# Patient Record
Sex: MALE | URBAN - METROPOLITAN AREA
[De-identification: names, ages, dates, MRNs, and addresses within clinical notes are randomized per-mention and may not be internally consistent; named-entity substitution may affect disease eponyms.]

---

## 2017-05-23 ENCOUNTER — THERAPY VISIT (OUTPATIENT)
Dept: PHYSICAL THERAPY | Facility: CLINIC | Age: 57
End: 2017-05-23
Payer: COMMERCIAL

## 2017-05-23 DIAGNOSIS — M54.42 BILATERAL LOW BACK PAIN WITH LEFT-SIDED SCIATICA: Primary | ICD-10-CM

## 2017-05-23 PROCEDURE — 97161 PT EVAL LOW COMPLEX 20 MIN: CPT | Mod: GP | Performed by: PHYSICAL THERAPIST

## 2017-05-23 PROCEDURE — 97110 THERAPEUTIC EXERCISES: CPT | Mod: GP | Performed by: PHYSICAL THERAPIST

## 2017-05-23 NOTE — LETTER
[facility] ATHLETIC [facility] PHYSICAL [facility]  [address]  [facility] [address]  [phone]    May 24, 2017    Re: Mati Hernandez   :   1960   MRN:  [id number]   REFERRING PHYSICIAN:   Jonah Allison    [facility] ATHLETIC [facility] PHYSICAL [facility]  Date of Initial Evaluation:  2017  Visits:1   Rxs Used: 1  Reason for Referral:  Bilateral low back pain with left-sided sciatica  EVALUATION SUMMARY  Subjective:  Patient is a 56 year old male presenting with rehab back hpi. The history is provided by the patient. Mati Hernandez is a 56 year old male with a lumbar condition.  Condition occurred with:  Insidious onset.  Condition occurred: for unknown reasons.  This is a recurrent and chronic condition  Patient reports a 2-3 year history of low back pain which he relates to prolonged standing for work as .  Patient felt his left low back pain was improving/ was controlled with meds until about 4 weeks ago.  At that time patient experienced a pinching pain at his left low back with radiation down lateral LE to ankle.  Patient has L5 injection 1-2 weeks ago with some relief.  Symptoms are worse with walking and with standing in partial bending at work..  Patient reports pain:  Lumbar spine right and lumbar spine left.  Radiates to:  Gluteals left, gluteals right, lower leg left, thigh left and foot left.  Pain is described as aching and shooting and is constant and intermittent (constant LB, intermittent LE) and reported as 5/10.   Pain is the same all the time.  Symptoms are exacerbated by bending, walking and standing and relieved by rest (lying down).  Since onset symptoms are unchanged.  Special tests:  MRI.      General health as reported by patient is good.  Pertinent medical history includes:  Overweight, high blood pressure, asthma and sleep disorder/apnea.  Medical allergies: no.  Other surgeries include:  None reported.  Current medications:   Sleep medication, anti-inflammatory, pain medication, steroids and high blood pressure medication.  Current occupation is  .  Patient is working in normal job without restrictions.  Primary job tasks include:  Prolonged standing.Barriers include:  None as reported by patient.Red flags:  Significant weakness.Oswestry Score: 38 %                  Objective:  Standing Alignment:    Lumbar:  Convex scoliosis L and lordosis decr  Gait:    Gait Type:  Normal   Assistive Devices:  None  Lumbar/SI Evaluation  ROM:    AROM Lumbar:   Flexion:          Fingertips to mid lower leg with left buttock complaint  Ext:                    Mod loss with bilat Lbp, L buttock pain   Side Bend:        Left:     Right:   Rotation:           Left:     Right:     Re: Mati Hernandez   :   1960     Side Glide:        Left:  No change    Right:  No change  Lumbar Myotomes:  normal  Lumbar DTR's:  normal  Neural Tension/Mobility:  Lumbar:  Normal    Lumbar Palpation:  Palpation (lumbar): increased muscle holding lumbar paraspinals.    Alycia Lumbar Evaluation  Test Movements:  FIS: During: increases  After: no worse  Pretest Movements: bilat LB/buttock  Repeat FIS: During: increases  After: worse    EIS: During: increases  After: no worse    Repeat EIS: During: decreases  After: better    ERICA: During: no effect  After: no effect    Repeat ERICA: During: no effect  After: no effect    EIL: During: increases  After: no worse    Repeat EIL: During: centralizing  After: better  Mechanical Response: IncROM  Assessment/Plan:    Patient is a 56 year old male with lumbar complaints.    Patient has the following significant findings with corresponding treatment plan.                Diagnosis 1:  Low back pain  Pain -  manual therapy, self management, education, directional preference exercise and home program  Decreased ROM/flexibility - manual therapy and therapeutic exercise  Decreased strength - therapeutic exercise and  therapeutic activities  Decreased function - therapeutic activities  Therapy Evaluation Codes:   1) History comprised of:   Personal factors that impact the plan of care:      None.    Comorbidity factors that impact the plan of care are:      None.     Medications impacting care: None.  2) Examination of Body Systems comprised of:   Body structures and functions that impact the plan of care:      Lumbar spine.   Activity limitations that impact the plan of care are:      Standing, Walking and Working.  3) Clinical presentation characteristics are:   Stable/Uncomplicated.  4) Decision-Making    Low complexity using standardized patient assessment instrument and/or measureable assessment of functional outcome.  Cumulative Therapy Evaluation is: Low complexity.  Previous and current functional limitations:  (See Goal Flow Sheet for this information)    Short term and Long term goals: (See Goal Flow Sheet for this information)   Communication ability:  Patient appears to be able to clearly communicate and understand verbal and written communication and follow directions correctly.  Treatment Explanation - The following has been discussed with the patient:   RX ordered/plan of care  Re: Mati Hernandez   :   1960     Anticipated outcomes  Possible risks and side effects  This patient would benefit from PT intervention to resume normal activities.   Rehab potential is good.  Frequency:  1 X week, once daily  Duration:  for 6-10 per MD visits  Discharge Plan:  Achieve all LTG.  Independent in home treatment program.  Reach maximal therapeutic benefit.    Thank you for your referral.    INQUIRIES  Therapist: Nory Cortez PT, [facility]  [facility] [place]  [address] [place], [place] [address] [address]  Phone: [phone]  Fax: [phone]

## 2017-05-24 NOTE — PROGRESS NOTES
Subjective:    Patient is a 56 year old male presenting with rehab left ankle/foot hpi.                                      Pertinent medical history includes:  Overweight, high blood pressure, asthma and sleep disorder/apnea.  Medical allergies: no.  Other surgeries include:  None reported.  Current medications:  Sleep medication, anti-inflammatory, pain medication, steroids and high blood pressure medication.  Current occupation is  .  Patient is working in normal job without restrictions.  Primary job tasks include:  Prolonged standing.    Barriers include:  None as reported by patient.    Red flags:  Significant weakness.      Oswestry Score: 38 %                 Objective:    System    Physical Exam    General     ROS    Assessment/Plan:

## 2017-06-05 ENCOUNTER — THERAPY VISIT (OUTPATIENT)
Dept: PHYSICAL THERAPY | Facility: CLINIC | Age: 57
End: 2017-06-05
Payer: COMMERCIAL

## 2017-06-05 DIAGNOSIS — M54.42 BILATERAL LOW BACK PAIN WITH LEFT-SIDED SCIATICA: ICD-10-CM

## 2017-06-05 PROCEDURE — 97014 ELECTRIC STIMULATION THERAPY: CPT | Mod: GP | Performed by: PHYSICAL THERAPIST

## 2017-06-05 PROCEDURE — 97110 THERAPEUTIC EXERCISES: CPT | Mod: GP | Performed by: PHYSICAL THERAPIST

## 2017-06-26 ENCOUNTER — THERAPY VISIT (OUTPATIENT)
Dept: PHYSICAL THERAPY | Facility: CLINIC | Age: 57
End: 2017-06-26
Payer: COMMERCIAL

## 2017-06-26 DIAGNOSIS — M54.42 BILATERAL LOW BACK PAIN WITH LEFT-SIDED SCIATICA: ICD-10-CM

## 2017-06-26 PROCEDURE — 97140 MANUAL THERAPY 1/> REGIONS: CPT | Mod: GP | Performed by: PHYSICAL THERAPIST

## 2017-06-26 PROCEDURE — 97110 THERAPEUTIC EXERCISES: CPT | Mod: GP | Performed by: PHYSICAL THERAPIST

## 2017-07-03 ENCOUNTER — THERAPY VISIT (OUTPATIENT)
Dept: PHYSICAL THERAPY | Facility: CLINIC | Age: 57
End: 2017-07-03
Payer: COMMERCIAL

## 2017-07-03 DIAGNOSIS — M54.42 BILATERAL LOW BACK PAIN WITH LEFT-SIDED SCIATICA: ICD-10-CM

## 2017-07-03 PROCEDURE — 97112 NEUROMUSCULAR REEDUCATION: CPT | Mod: GP | Performed by: PHYSICAL THERAPIST

## 2017-07-03 PROCEDURE — 97110 THERAPEUTIC EXERCISES: CPT | Mod: GP | Performed by: PHYSICAL THERAPIST

## 2017-07-03 PROCEDURE — 97140 MANUAL THERAPY 1/> REGIONS: CPT | Mod: GP | Performed by: PHYSICAL THERAPIST

## 2017-07-17 ENCOUNTER — THERAPY VISIT (OUTPATIENT)
Dept: PHYSICAL THERAPY | Facility: CLINIC | Age: 57
End: 2017-07-17
Payer: COMMERCIAL

## 2017-07-17 DIAGNOSIS — M54.42 BILATERAL LOW BACK PAIN WITH LEFT-SIDED SCIATICA: ICD-10-CM

## 2017-07-17 PROCEDURE — 97530 THERAPEUTIC ACTIVITIES: CPT | Mod: GP | Performed by: PHYSICAL THERAPIST

## 2017-07-17 PROCEDURE — 97140 MANUAL THERAPY 1/> REGIONS: CPT | Mod: GP | Performed by: PHYSICAL THERAPIST

## 2017-07-17 PROCEDURE — 97110 THERAPEUTIC EXERCISES: CPT | Mod: GP | Performed by: PHYSICAL THERAPIST

## 2017-08-15 ENCOUNTER — THERAPY VISIT (OUTPATIENT)
Dept: CHIROPRACTIC MEDICINE | Facility: CLINIC | Age: 57
End: 2017-08-15
Payer: COMMERCIAL

## 2017-08-15 DIAGNOSIS — M99.05 SOMATIC DYSFUNCTION OF PELVIS REGION: ICD-10-CM

## 2017-08-15 DIAGNOSIS — M54.42 CHRONIC LEFT-SIDED LOW BACK PAIN WITH LEFT-SIDED SCIATICA: Primary | ICD-10-CM

## 2017-08-15 DIAGNOSIS — G89.29 CHRONIC LEFT-SIDED LOW BACK PAIN WITH LEFT-SIDED SCIATICA: Primary | ICD-10-CM

## 2017-08-15 DIAGNOSIS — M99.03 SOMATIC DYSFUNCTION OF LUMBAR REGION: ICD-10-CM

## 2017-08-15 DIAGNOSIS — M99.02 THORACIC SEGMENT DYSFUNCTION: ICD-10-CM

## 2017-08-15 PROCEDURE — 97035 APP MDLTY 1+ULTRASOUND EA 15: CPT | Performed by: CHIROPRACTOR

## 2017-08-15 PROCEDURE — 98941 CHIROPRACT MANJ 3-4 REGIONS: CPT | Mod: AT | Performed by: CHIROPRACTOR

## 2017-08-15 PROCEDURE — 97810 ACUP 1/> WO ESTIM 1ST 15 MIN: CPT | Performed by: CHIROPRACTOR

## 2017-08-15 PROCEDURE — 99203 OFFICE O/P NEW LOW 30 MIN: CPT | Mod: 25 | Performed by: CHIROPRACTOR

## 2017-08-15 NOTE — PROGRESS NOTES
Initial Chiropractic Clinic Visit    PCP: No primary care provider on file.    Mati Hernandez is a 56 year old male who is seen  as a self referral presenting with chronic lower back and occasional radicular leg pain. Patient reports that the onset was 3 years ago for unknown reasons. When asked, patient denies:, falling, slipping, bending and reaching or sleeping awkwardly. Patient states prolonged standing and bending with his job and home maybe contributin. Patient reports having a MRI done of the lumbar spine in the last year which showed degenerative disc disease in the lumbar spine  Prior to onset, the patient was able to sit 2 hours and stand 2 hours without increased back pain. Patient notes that due to symptoms, they can only stand an hour and sit an hour without increase in pain. Mati Hernandez notes   standing rated at a 6/10 painful and prior to this incident it was 1/10.        Injury: No history of trauma or injury    Location of Pain: bilateral lower back, lower thoracic at the following level(s) T10, T12 , L5 , Sacrum  and PSIS Left   Duration of Pain: 3 years   Rating of Pain at worst: 8/10  Rating of Pain Currently: 6/10  Symptoms are better with: medications  Symptoms are worse with: prolonged sitting, standing, bending  Additional Features: Pain will travel down the back of the left leg. Pain travels down the side of the right leg.     Health History  as reported by the patient:    How does the patient rate their own health:   Good    Current or past medical history:   Asthma, High blood pressure and Overweight, pain at night, calf pain-due to radicular leg pain.    Medical allergies  None    Past Traumas/Surgeries  None    Family History  This patient has no significant family history    Medications:  Anti-inflammatory, High blood pressure, Pain and Sleep    Occupation:      Primary job tasks:   Prolonged sitting, Prolonged standing and Repetitive tasks    Barriers as home/work:  "  none    Additional health Issues:     NA        Review of Systems  Musculoskeletal: as above  Remainder of review of systems is negative including constitutional, CV, pulmonary, GI, Skin and Neurologic except as noted in HPI or medical history.    No past medical history on file.  No past surgical history on file.  Objective  There were no vitals taken for this visit.      GENERAL APPEARANCE: healthy, alert and no distress   GAIT: NORMAL  SKIN: no suspicious lesions or rashes  NEURO: Normal strength and tone, mentation intact and speech normal  PSYCH:  mentation appears normal and affect normal/bright    Low back exam:    Inspection:  \"     no visible deformity in the low back       normal skin\",    ROM:       limited flexion due to pain       limited extension due to pain    Tender:       paraspinal muscles    Non Tender:       remainder of lumbar spine    Strength:       ankle dorsiflexion 5/5       ankle plantarflexion 5/5       dorsiflexion of the great toe 5/5    Reflexes:       patellar (L3, L4) symmetric normal       achilles tendons (S1) symmetric normal    Sensation:      grossly intact throughout lower extremities    Special tests:  Kemps - Right negative and Left negative, SLR - Right negative and Left negative, Slump - Right negative and Left negative and Fabere - Right negative and Left positive, produced SIJ pain    Segmental spinal dysfunction/restrictions found at::  T5 Right rotation restricted  T10 Right rotation restricted  L4 Left rotation restricted  L5 Left rotation restricted  PSIS Left Flexion restriction.    The following soft tissue hypotonicities were observed:Quad lumb: left, referred pain: no    Trigger points were found in:Lumbar erector spine    Muscle spasm found in:Lumbar erector spine, Piriformis, Quad lumb and T-spine paraspinal      Radiology:  None today    Assessment:    No diagnosis found.    RX ordered/plan of care  Anticipated outcomes  Possible risks and side " effects    After discussing the risk and benefits of care, patient consented to treatment    Prognosis: Guarded      Patient's condition:  Patient had restrictions pre-manipulation    Treatment effectiveness:  Post manipulation there is better intersegmental movement and Patient claims to feel looser post manipulation      Plan:    Procedures:  Evaluation and Management:  12978 Moderate level exam 30 min    CMT:  87896 Chiropractic manipulative treatment 3-4 regions performed   Thoracic: Diversified, T5, T10, Prone  Lumbar: Diversified, L5, Side posture-left side up only  Pelvis: Drop Table, PSIS Left , Prone    Modalities:  08770: US:  2.0 Bowers/cm squared for 8 minutes at 1mhz  cont pulsed, Location:left L/S spine  59141: Acupuncture, for 15 minutes:  Points: for lower back, right and left leg pain-patient prone 15min    Therapeutic procedures:  Continue HEP given in PT    Response to Treatment  Patient tolerated the treatment well today.      Treatment plan and goals:  Goals:  SITTING: the patient specific goal is to attain pre-injury status of  2 hours comfortably  STANDING: the patient specific goal is to attain the pre-injury status of 2 hours comfortably     Frequency of care  Duration of care is estimated to be 8 weeks, from the initial treatment.  It is estimated that the patient will need a total of 6-8 visits to resolve this episode.  For the initial therapeutic trial of care, the frequency is recommended at 1 X week, once daily.  A reevaluation would be clinically appropriate in 8 visits, to determine progress and further course of care.    In-Office Treatment  Evaluation  Chiropractic manipulative treatment 3-4 regions performed   Thoracic: Diversified, T5, T10, Prone  Lumbar: Diversified, L5, Side posture-left side up only  Pelvis: Drop Table, PSIS Left , Prone    Modalities:  77085: US:  2.0 Bowers/cm squared for 8 minutes at 1mhz  cont pulsed, Location:left L/S spine  26759: Acupuncture, for 15  minutes:  Points: for lower back, right and left leg pain-patient prone 15min      Recommendations:    Instructions:ice 20 minutes every other hour as needed    Follow-up:  Return to care in one week.       Discussed the assessment with the patient.      Disclaimer: This note consists of symbols derived from keyboarding, dictation and/or voice recognition software. As a result, there may be errors in the script that have gone undetected. Please consider this when interpreting information found in this chart.

## 2017-08-22 ENCOUNTER — THERAPY VISIT (OUTPATIENT)
Dept: CHIROPRACTIC MEDICINE | Facility: CLINIC | Age: 57
End: 2017-08-22
Payer: COMMERCIAL

## 2017-08-22 DIAGNOSIS — M54.6 PAIN IN THORACIC SPINE: ICD-10-CM

## 2017-08-22 DIAGNOSIS — G89.29 CHRONIC BILATERAL LOW BACK PAIN WITHOUT SCIATICA: Primary | ICD-10-CM

## 2017-08-22 DIAGNOSIS — M99.05 SOMATIC DYSFUNCTION OF PELVIS REGION: ICD-10-CM

## 2017-08-22 DIAGNOSIS — M54.50 CHRONIC BILATERAL LOW BACK PAIN WITHOUT SCIATICA: Primary | ICD-10-CM

## 2017-08-22 DIAGNOSIS — M99.03 SOMATIC DYSFUNCTION OF LUMBAR REGION: ICD-10-CM

## 2017-08-22 PROCEDURE — 98941 CHIROPRACT MANJ 3-4 REGIONS: CPT | Mod: AT | Performed by: CHIROPRACTOR

## 2017-08-22 PROCEDURE — 97810 ACUP 1/> WO ESTIM 1ST 15 MIN: CPT | Performed by: CHIROPRACTOR

## 2017-08-22 NOTE — PROGRESS NOTES
Visit #:  2 of 6 based on treatment plan 4-6    Subjective:  Mati Hernandez is a 56 year old male who is seen in f/u up for: mid to lower back pain     Data Unavailable.     Since last visit on 8/15/2017,  Mati Hernandez reports the following changes: Pain immediately after last treatment: 6/10 and their pain level today 6/10. Standing rated at a 6/10 painful and prior to initial visit 7/10 and prior to this incident it was 2/10. Overall no change    Area of chief complaint:  Thoracic and Lumbar :  Symptoms are graded at 6/10. The quality is described as stiff, achey, dull.  Motion has remained about the same, no improvement, T5, L5, PSIS left. Patient feels that they have not improved and feel the same.     Since last visit the patient feels that they are 0 percent  improved from last visit.       Objective:  The following was observed:    P: pain elicited on palpation, L5    A: static palpation demonstrates intersegmental asymmetry , T5, t10, L5, SIJ-right    R: motion palpation notes restricted motion    T: muscle spasm at level(s): Lumbar erector spine, Piriformis, Quad lumb and T-spine paraspinal Bilaterally      Assessment:    Segmental spinal dysfunction/restrictions found at:  T5  T10  T12  L1  L5  PSIS Left  PSIS Right    Diagnoses:    No diagnosis found.    Patient's condition:  Patient had restrictions pre-manipulation    Treatment effectiveness:  Post manipulation there is better intersegmental movement and Patient claims to feel looser post manipulation      Procedures:  CMT:  Chiropractic manipulative treatment 3-4 regions performed   Thoracic: Diversified, T5, T10, Prone  Lumbar: Diversified, L5, Side posture-left side up only  Pelvis: Drop Table, PSIS Left , Prone    Modalities:  19939: Acupuncture, for 15 minutes:  Points: for lower back, right and left leg pain-patient prone 15min    Therapeutic procedures:  None      Prognosis: Guarded    Progress towards Goals: Patient has not made progress towards  goal of SITTING: the patient specific goal is to attain pre-injury status of  2 hours comfortably  STANDING: the patient specific goal is to attain the pre-injury status of 2 hours comfortably .     Response to Treatment:   Patient tolerated the treatment well today.      Recommendations:    Instructions:ice 20 minutes every other hour as needed    Follow-up:  Return to care in 2 weeks.

## 2022-08-18 ENCOUNTER — TELEPHONE (OUTPATIENT)
Dept: TRANSPLANT | Facility: CLINIC | Age: 62
End: 2022-08-18

## 2022-08-18 DIAGNOSIS — C85.90 NHL (NON-HODGKIN'S LYMPHOMA) (H): Primary | ICD-10-CM

## 2022-08-18 DIAGNOSIS — C83.18 MANTLE CELL LYMPHOMA OF LYMPH NODES OF MULTIPLE SITES (H): ICD-10-CM
